# Patient Record
Sex: FEMALE | Race: BLACK OR AFRICAN AMERICAN | NOT HISPANIC OR LATINO
[De-identification: names, ages, dates, MRNs, and addresses within clinical notes are randomized per-mention and may not be internally consistent; named-entity substitution may affect disease eponyms.]

---

## 2022-04-27 PROBLEM — Z00.00 ENCOUNTER FOR PREVENTIVE HEALTH EXAMINATION: Status: ACTIVE | Noted: 2022-04-27

## 2022-05-17 ENCOUNTER — APPOINTMENT (OUTPATIENT)
Dept: ORTHOPEDIC SURGERY | Facility: CLINIC | Age: 77
End: 2022-05-17
Payer: MEDICARE

## 2022-05-17 ENCOUNTER — APPOINTMENT (OUTPATIENT)
Dept: ORTHOPEDIC SURGERY | Facility: CLINIC | Age: 77
End: 2022-05-17

## 2022-05-17 VITALS — WEIGHT: 202 LBS | HEIGHT: 71 IN | BODY MASS INDEX: 28.28 KG/M2

## 2022-05-17 DIAGNOSIS — Z63.4 DISAPPEARANCE AND DEATH OF FAMILY MEMBER: ICD-10-CM

## 2022-05-17 PROCEDURE — 99214 OFFICE O/P EST MOD 30 MIN: CPT

## 2022-05-17 RX ORDER — OMEPRAZOLE 20 MG/1
20 CAPSULE, DELAYED RELEASE ORAL
Refills: 0 | Status: ACTIVE | COMMUNITY

## 2022-05-17 RX ORDER — APIXABAN 5 MG/1
TABLET, FILM COATED ORAL
Refills: 0 | Status: ACTIVE | COMMUNITY

## 2022-05-17 RX ORDER — OXYBUTYNIN CHLORIDE 2.5 MG/1
TABLET ORAL
Refills: 0 | Status: ACTIVE | COMMUNITY

## 2022-05-17 SDOH — SOCIAL STABILITY - SOCIAL INSECURITY: DISSAPEARANCE AND DEATH OF FAMILY MEMBER: Z63.4

## 2022-05-17 NOTE — PHYSICAL EXAM
[Right] : right knee [NL (0)] : extension 0 degrees [Left] : left knee [4___] : hamstring 4[unfilled]/5 [] : no erythema [TWNoteComboBox7] : flexion 90 degrees [de-identified] : extension -5 degrees

## 2022-05-17 NOTE — HISTORY OF PRESENT ILLNESS
[8] : 8 [7] : 7 [Burning] : burning [Dull/Aching] : dull/aching [Sharp] : sharp [Retired] : Work status: retired [de-identified] : Follow-up bilateral knees. She completed Orthovisc injections (4/2/22) with little relief. Still notes significant pain in both knee (L>R). She has been going to PT and doing HEP. She had consult for TKA with Dr. Godfrey. [] : Post Surgical Visit: no [FreeTextEntry1] : chela knees

## 2022-05-17 NOTE — DISCUSSION/SUMMARY
[de-identified] : Patient is ready to move forward with TKA.\par \par Progress note completed by Rachel Gerco PA-C.. to have a total knee with Dr Lemus

## 2022-08-17 ENCOUNTER — APPOINTMENT (OUTPATIENT)
Dept: SURGERY | Facility: CLINIC | Age: 77
End: 2022-08-17

## 2022-08-17 VITALS — WEIGHT: 195 LBS | HEIGHT: 71 IN | TEMPERATURE: 97.6 F | BODY MASS INDEX: 27.3 KG/M2

## 2022-08-17 DIAGNOSIS — K56.50 INTESTINAL ADHESIONS [BANDS], UNSPECIFIED AS TO PARTIAL VERSUS COMPLETE OBSTRUCTION: ICD-10-CM

## 2022-08-17 DIAGNOSIS — M17.12 UNILATERAL PRIMARY OSTEOARTHRITIS, LEFT KNEE: ICD-10-CM

## 2022-08-17 DIAGNOSIS — M17.11 UNILATERAL PRIMARY OSTEOARTHRITIS, RIGHT KNEE: ICD-10-CM

## 2022-08-17 DIAGNOSIS — Z87.898 PERSONAL HISTORY OF OTHER SPECIFIED CONDITIONS: ICD-10-CM

## 2022-08-17 DIAGNOSIS — Z87.19 PERSONAL HISTORY OF OTHER DISEASES OF THE DIGESTIVE SYSTEM: ICD-10-CM

## 2022-08-17 PROCEDURE — 99203 OFFICE O/P NEW LOW 30 MIN: CPT

## 2022-08-18 PROBLEM — Z87.19 HISTORY OF GASTROESOPHAGEAL REFLUX (GERD): Status: RESOLVED | Noted: 2022-05-17 | Resolved: 2022-08-18

## 2022-08-18 PROBLEM — M17.11 PRIMARY OSTEOARTHRITIS OF RIGHT KNEE: Status: ACTIVE | Noted: 2022-05-17

## 2022-08-18 PROBLEM — Z87.898 HISTORY OF URINARY FREQUENCY: Status: RESOLVED | Noted: 2022-05-17 | Resolved: 2022-08-18

## 2022-08-18 PROBLEM — K56.50 SMALL BOWEL OBSTRUCTION DUE TO ADHESIONS: Status: ACTIVE | Noted: 2022-08-18

## 2022-08-18 PROBLEM — M17.12 PRIMARY OSTEOARTHRITIS OF LEFT KNEE: Status: ACTIVE | Noted: 2022-05-17

## 2022-08-18 NOTE — HISTORY OF PRESENT ILLNESS
[de-identified] : This 77 year old woman recently underwent lysis of adhesions for a SBO. She had just undergone knee surgery before the SBO symptoms began. Presently, she is feeling well, as she is tolerating food and passing stool easily. There is not reported fever, chills, nausea or vomiting.

## 2022-08-18 NOTE — PHYSICAL EXAM
[Normal Breath Sounds] : Normal breath sounds [Normal Heart Sounds] : normal heart sounds [Normal Rate and Rhythm] : normal rate and rhythm [Abdominal Masses] : No abdominal masses [Abdomen Tenderness] : ~T ~M No abdominal tenderness [No Rash or Lesion] : No rash or lesion [de-identified] : nl [de-identified] : nl [de-identified] : well healed incision [de-identified] : nl

## 2022-08-18 NOTE — ASSESSMENT
[FreeTextEntry1] : Long discussion regarding all options and risks\par To continue PT for the postop knee surgery\par To return in one to evaluate the abdominal wound healing\par All lab values and imaging studies reviewed\par Discussed with Medicine